# Patient Record
(demographics unavailable — no encounter records)

---

## 2024-10-14 NOTE — PHYSICAL EXAM
[Well Developed] : well developed [Well Nourished] : well nourished [No Acute Distress] : no acute distress [Normal Conjunctiva] : normal conjunctiva [Normal Venous Pressure] : normal venous pressure [No Carotid Bruit] : no carotid bruit [Normal S1, S2] : normal S1, S2 [No Murmur] : no murmur [No Rub] : no rub [No Gallop] : no gallop [Good Air Entry] : good air entry [No Respiratory Distress] : no respiratory distress  [Soft] : abdomen soft [Non Tender] : non-tender [No Masses/organomegaly] : no masses/organomegaly [Normal Bowel Sounds] : normal bowel sounds [Normal Gait] : normal gait [No Edema] : no edema [No Cyanosis] : no cyanosis [No Clubbing] : no clubbing [No Varicosities] : no varicosities [No Rash] : no rash [No Skin Lesions] : no skin lesions [Moves all extremities] : moves all extremities [No Focal Deficits] : no focal deficits [Normal Speech] : normal speech [Alert and Oriented] : alert and oriented [Normal memory] : normal memory [de-identified] : Mild diffuse ronchi

## 2024-10-14 NOTE — PHYSICAL EXAM
[Well Developed] : well developed [Well Nourished] : well nourished [No Acute Distress] : no acute distress [Normal Conjunctiva] : normal conjunctiva [Normal Venous Pressure] : normal venous pressure [No Carotid Bruit] : no carotid bruit [Normal S1, S2] : normal S1, S2 [No Murmur] : no murmur [No Rub] : no rub [No Gallop] : no gallop [Good Air Entry] : good air entry [No Respiratory Distress] : no respiratory distress  [Soft] : abdomen soft [Non Tender] : non-tender [No Masses/organomegaly] : no masses/organomegaly [Normal Bowel Sounds] : normal bowel sounds [Normal Gait] : normal gait [No Edema] : no edema [No Cyanosis] : no cyanosis [No Clubbing] : no clubbing [No Varicosities] : no varicosities [No Rash] : no rash [No Skin Lesions] : no skin lesions [Moves all extremities] : moves all extremities [No Focal Deficits] : no focal deficits [Normal Speech] : normal speech [Alert and Oriented] : alert and oriented [Normal memory] : normal memory [de-identified] : Mild diffuse ronchi

## 2024-10-14 NOTE — ASSESSMENT
[FreeTextEntry1] : Ms. Abreu is a 73-year-old woman with history of AF on AC, tracheal stenosis s/p dilation, SANTINO, HTN, HLD, pHTN, and RLE DVT (in the setting of acute illness/COVID) presenting for follow up.  Impression: (1) Paroxysmal AF, CHADSVASc at least 4 (F, Age, HTN, DM2), on AC, in NSR today, stable. (2) Tracheal stenosis s/p dilation x4, following with [facility], being considered for repeat dilation, most recent dilation c/b tracheitis treated with steroids and antibiotics. (3) Mild-moderate pulmonary hypertension, WHO group III. (4) SANTINO, previously on CPAP, states she was told she no longer needs it. (5) HTN, well controlled (6) HLD, well controlled, LDL <100 (7) RLE DVT, 2/2 acute illness, is on AC for AF  Plan: - NM stress to evaluate progression of WEAVER - Repeat TTE - BP is well controlled at home per patient; if SBP is >140 or DBP >90, would add on a beta blocker in light of her Dx of AF. - Discussed the etiology, clinical course, and treatment options of atrial fibrillation in detail. Introduced the concept of ablation. Consider referral to EP in the future if she has documented recurrence.

## 2024-10-14 NOTE — HISTORY OF PRESENT ILLNESS
[FreeTextEntry1] : Ms. Abreu is a 73-year-old woman with history of AF on AC, tracheal stenosis s/p dilation, SANTINO, HTN, HLD, pHTN, and RLE DVT (in the setting of acute illness/COVID) presenting for follow up.  Patient previously followed with Dr. Rodriguez and was last seen by him in office 7/2022.  She was initially diagnosed with atrial fibrillation on 5/2020 in the context of an admission for severe COVID infection with respiratory failure requiring intubation with a total hospital stay exceeding 7 months, including rehab.  She has been following with [facility] Dr. Miranda for tracheal stenosis and has undergone 4 dilations with her most recent one complicated by tracheitis, treated with steroids and antibiotics. Plan is for consideration of repeat tracheal dilation but has not yet scheduled a follow up.  First visit with me 12/2022 "...reports a mild improvement in exertional dyspnea after dilation with ET of about 1 block. She denies chest pain, palpitations, pre-syncope, syncope, LE swelling, PND, or orthopnea."  Today has progression of her usual dyspnea on exertion without overt chest pain.  TTE 6/2022 - Normal biventricular function, mild MR, mildly pHTN (PASP 49mmHg) TTE 6/2023: Normal EF, normal diastolic function, mild pHTN (PASP 44mmHg) CT Neck Non-Con 2022  - Redemonstration of tracheal stenosis ... slightly worsened since prior CT of 4/22/21. NM Stress Lexiscan 7/2021 - Normal study 14 Day Event Monitor 3/2021 - No AF, rare PVCs (<0.1%), no pauses LE Duplex 2/2021 - No DVT bilaterally MR Brain 9/2020 - Mild chronic microvascular disease  Labs: - Plt 230, Hgb 14.3 - Cr 1.0, K 3.9, AST 21, ALT 27 - , , HDL 59, VLDL 20, LDL 90 - A1c 6.0%, TSH 2.1, hsCRP 3.87

## 2024-10-17 NOTE — PHYSICAL EXAM
[No Acute Distress] : no acute distress [Nasal congestion] : nasal congestion [Normal Appearance] : normal appearance [No Neck Mass] : no neck mass [Normal Rate/Rhythm] : normal rate/rhythm [Normal S1, S2] : normal s1, s2 [No Murmurs] : no murmurs [No Resp Distress] : no resp distress [No Abnormalities] : no abnormalities [Benign] : benign [Normal Gait] : normal gait [No Clubbing] : no clubbing [No Cyanosis] : no cyanosis [No Edema] : no edema [FROM] : FROM [Normal Color/ Pigmentation] : normal color/ pigmentation [No Focal Deficits] : no focal deficits [Oriented x3] : oriented x3 [Normal Affect] : normal affect [TextBox_11] : PND [TextBox_68] : Harsh breath sounds bilaterally but no wheezing.  No stridor over the trachea

## 2024-10-17 NOTE — ASSESSMENT
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 She needs to try to increase her exercise tolerance  A: Allergic rhinitis:  I feel the patient has a post nasal drip syndrome due to allergen exposure . Rx to use saline nasal washes BID. try Azelastine at HS. f/u 6m   FU in 6  months

## 2024-10-17 NOTE — HISTORY OF PRESENT ILLNESS
[TextBox_4] : - Summary : The patient came in today with chief complaints of cough, chest congestion, and post-nasal drip. Incidentally, there was mention of an allergy-like syndrome common post COVID-19 infection or vaccination. The patient informed that she's experiencing more allergic reactions lately and the drip feeling in throat. - Chief Complaint (CC) : The patient reported coughing, chest congestion, and a post-nasal drip. - History of Present Illness : Berenice Abreu, a female patient seen today presenting symptoms of coughing, chest congestion, and a sensation of post-nasal drip. She reports she has been feeling this way for a while, with a noticeable change in her allergies. She recently visited [place] and began feeling sick, particularly over the last few days while there. The patient had been diagnosed with pneumonia prior to Labor Day, but didn't receive antibiotics, though she mentioned that she was aware of her damaged lungs which might have caused confusion in diagnosis.

## 2025-04-09 NOTE — ASSESSMENT
[FreeTextEntry1] : 73 year old female with history of severe Covid 19 infection complicated by respiratory failure , VTE , tracheal stenosis  Chronic respiratory insufficiency on O2  PRN , bronchodilators. Paroxysmal atrial fibrillation , on eliquis  Obesity , diabetes , fatty liver  GE reflux elevated ferritin likely due to hepatic steatosis , metabolic syndrome ? Hoarsness , due to GE reflux ? sinusitis ?  Episodes of diarrhea ? history of colon polyps  Plan ; repeat iron studies , hemochromatosis gene mutation ,            inflammatory markers , CMP          GI follow up , consider ENT           encouraged to have annual mammogram           all questions answered in detail .

## 2025-04-09 NOTE — REASON FOR VISIT
[FreeTextEntry2] : elevated ferritin  Propranolol Counseling:  I discussed with the patient the risks of propranolol including but not limited to low heart rate, low blood pressure, low blood sugar, restlessness and increased cold sensitivity. They should call the office if they experience any of these side effects.

## 2025-04-09 NOTE — HISTORY OF PRESENT ILLNESS
[de-identified] : 73 year old female (  , Indian ) referred for elevated ferritin , PMH of obesity , fatty liver , diabetes , SANTINO  no longer using CPAP , hypertension , Severe COvid 19 infection in 2020 required hospitalization for 7 months , ventilatory support , tracheostomy , Covid related VTE , atrial fibrillation , tracheal stenosis s/p dilatation , chronic cough , asthma , respiratory insufficiency on O2 PRN . GE reflux on PPI .  She is referred for ferritin of 700 . denies iron intake .  no known family history of hemochromatosis , liver disease. Symptoms : she has dyspnea on mild exertion , less than one block or one flight of stairs, she reports few episodes of diarrhea ( loose stools since February 2025 ) Raspy , hoarse voice for few weeks , also increased GE reflux symptoms , cough at night .  Health Maintenance : last mammogram 2 years ago , colonoscopy 2 - 3years ( showed adenomas )

## 2025-04-09 NOTE — HISTORY OF PRESENT ILLNESS
[de-identified] : 73 year old female (  , Indian ) referred for elevated ferritin , PMH of obesity , fatty liver , diabetes , SANTINO  no longer using CPAP , hypertension , Severe COvid 19 infection in 2020 required hospitalization for 7 months , ventilatory support , tracheostomy , Covid related VTE , atrial fibrillation , tracheal stenosis s/p dilatation , chronic cough , asthma , respiratory insufficiency on O2 PRN . GE reflux on PPI .  She is referred for ferritin of 700 . denies iron intake .  no known family history of hemochromatosis , liver disease. Symptoms : she has dyspnea on mild exertion , less than one block or one flight of stairs, she reports few episodes of diarrhea ( loose stools since February 2025 ) Raspy , hoarse voice for few weeks , also increased GE reflux symptoms , cough at night .  Health Maintenance : last mammogram 2 years ago , colonoscopy 2 - 3years ( showed adenomas )

## 2025-05-29 NOTE — PHYSICAL EXAM
[Nasal Endoscopy Performed] : nasal endoscopy was performed, see procedure section for findings [Normal] : mucosa is normal [Midline] : trachea located in midline position [de-identified] : manuel

## 2025-05-29 NOTE — PROCEDURE
[Hoarseness] : hoarseness not clearly evaluated by indirect laryngoscopy [None] : none [Flexible Endoscope] : examined with the flexible endoscope [True Vocal Cords Erythematous] : bilateral true vocal cord edema [Arytenoid Erythema ___ /4] : arytenoid erythema [unfilled]U/4 [Glottis Arytenoid Cartilages Erythema] : bilateral arytenoid ~M erythema [Normal] : posterior cricoid area had healthy pink mucosa in the interarytenoid area and the esophageal inlet

## 2025-05-29 NOTE — REASON FOR VISIT
[Subsequent Evaluation] : a subsequent evaluation for [FreeTextEntry2] : hoarse voice,  excess mucus , chronic cough ,

## 2025-05-29 NOTE — HISTORY OF PRESENT ILLNESS
[FreeTextEntry1] : Patient presents today c/o hoarse voice, excess mucus , chronic cough. Patient had covid in 2020 and was intubated for 8 months and since then she has been having a lot of issues. Here today because of hoarseness  in her voice that started about a hear ago. Has history of GERD. Currently takes Pantoprazole. Has globus sensation. No trouble swallowing. Had tracheostomy in the past. Constantly coughing and clearing throat.

## 2025-06-06 NOTE — PHYSICAL EXAM

## 2025-06-06 NOTE — HISTORY OF PRESENT ILLNESS
[FreeTextEntry1] : 73 y.o F a fib on eliquis, HTN, DL, DM2, GERD, asthma, tracheal stricture, tubular adenomas. Presents today for worsening GERD.  States she has acid reflux, waking up in the middle of the night despite taking pantoprazole daily and tums PRN. States while [place] country, she had 2 separate episodes of diarrhea lasting a few days each.  States it has returned to her baseline, and is constipated and feels like she is not emptying completely.   Otherwise, patient denies any other GI complaints and has no abd pain, no nausea or vomiting, no dysphagia or odynophagia,, no unintentional weight loss or appetite changes, no diarrhea, no constipation or changes in BMs including hematochezia or melena, no coffee ground emesis or hematemesis.  ROS otherwise negative.   ----------------------------------------------- Fam hx: no fam hx of CRC or GI cancers  ----------------------------------------------- 8/22/23 CT abd/pelvis w/ IV contrast: Large fatty liver. Moderate fecal retention with no bowel obstruction or abnormal bowel findings.  ----------------------------------------------- 2/27/23 colonoscopy: 3 Polyps (2 mm to 4 mm) in the ascending colon. (Polypectomy - TAs). Polyp (5 mm) in the sigmoid colon. (Polypectomy - TA).  prior colonoscopy > 5 years ago with Dr. Varma.

## 2025-06-06 NOTE — END OF VISIT
[FreeTextEntry3] : The NP was present during this visit for assistance with history acquisition and documentation. The plan of care was independently formulated by myself, the attending physician.  [Time Spent: ___ minutes] : I have spent [unfilled] minutes of time on the encounter which excludes teaching and separately reported services.

## 2025-06-06 NOTE — ASSESSMENT
[FreeTextEntry1] : 73 y.o F a fib on eliquis, HTN, DL, DM2, GERD, asthma, tracheal stricture, tubular adenomas. Presents today for worsening GERD.  #hoarseness #GERD  -Dietary and lifestyle modifications including weight loss HOB elevation, avoiding excessive coffee and limiting number to 1-2 cups / d, and limiting tea to 1-2 cups only. Also advised to avoid chocolate, fried foods, spicy foods, citrus foods, etc. avoid eating within 3 hrs of bedtime - Avoid NSAIDs. -Discussed proper administration of pantoprazole 40mg 30 minutes before breakfast daily; famotidine 40mg HS -If symptoms do not improve will increase PPI to BID   #painless hematochezia, resolved #Colon polyps, TAs -rpt colonoscopy in 2/2028  #hx E Coli Gastroenteritis, resolved  #Hepatic Steatosis -Fibroscan to assess for fibrosis -Hepatitis A, B, C Serologies  #Occasional LUQ pains, family H/O esophageal cancer; resolved  Follow up in 3 months.